# Patient Record
Sex: MALE | Race: WHITE | URBAN - METROPOLITAN AREA
[De-identification: names, ages, dates, MRNs, and addresses within clinical notes are randomized per-mention and may not be internally consistent; named-entity substitution may affect disease eponyms.]

---

## 2018-10-17 ENCOUNTER — APPOINTMENT (RX ONLY)
Dept: URBAN - METROPOLITAN AREA CLINIC 86 | Facility: CLINIC | Age: 28
Setting detail: DERMATOLOGY
End: 2018-10-17

## 2018-10-17 DIAGNOSIS — L663 OTHER SPECIFIED DISEASES OF HAIR AND HAIR FOLLICLES: ICD-10-CM

## 2018-10-17 DIAGNOSIS — L30.1 DYSHIDROSIS [POMPHOLYX]: ICD-10-CM

## 2018-10-17 DIAGNOSIS — L738 OTHER SPECIFIED DISEASES OF HAIR AND HAIR FOLLICLES: ICD-10-CM

## 2018-10-17 DIAGNOSIS — L73.9 FOLLICULAR DISORDER, UNSPECIFIED: ICD-10-CM

## 2018-10-17 PROBLEM — L02.02 FURUNCLE OF FACE: Status: ACTIVE | Noted: 2018-10-17

## 2018-10-17 PROBLEM — L70.0 ACNE VULGARIS: Status: ACTIVE | Noted: 2018-10-17

## 2018-10-17 PROCEDURE — ? COUNSELING

## 2018-10-17 PROCEDURE — 99202 OFFICE O/P NEW SF 15 MIN: CPT

## 2018-10-17 PROCEDURE — ? PRESCRIPTION

## 2018-10-17 RX ORDER — DOXYCYCLINE HYCLATE 100 MG/1
TABLET, COATED ORAL
Qty: 60 | Refills: 1 | Status: ERX | COMMUNITY
Start: 2018-10-17

## 2018-10-17 RX ORDER — CLOBETASOL PROPIONATE 0.5 MG/G
OINTMENT TOPICAL
Qty: 1 | Refills: 2 | Status: ERX | COMMUNITY
Start: 2018-10-17

## 2018-10-17 RX ORDER — CLINDAMYCIN PHOSPHATE 10 MG/ML
SOLUTION TOPICAL
Qty: 1 | Refills: 3 | Status: ERX | COMMUNITY
Start: 2018-10-17

## 2018-10-17 RX ADMIN — CLINDAMYCIN PHOSPHATE: 10 SOLUTION TOPICAL at 18:28

## 2018-10-17 RX ADMIN — DOXYCYCLINE HYCLATE: 100 TABLET, COATED ORAL at 18:28

## 2018-10-17 RX ADMIN — CLOBETASOL PROPIONATE: 0.5 OINTMENT TOPICAL at 18:38

## 2018-10-17 ASSESSMENT — LOCATION DETAILED DESCRIPTION DERM
LOCATION DETAILED: LEFT THENAR EMINENCE
LOCATION DETAILED: LEFT MEDIAL SUPERIOR CHEST

## 2018-10-17 ASSESSMENT — LOCATION SIMPLE DESCRIPTION DERM
LOCATION SIMPLE: LEFT HAND
LOCATION SIMPLE: CHEST

## 2018-10-17 ASSESSMENT — LOCATION ZONE DERM
LOCATION ZONE: TRUNK
LOCATION ZONE: HAND

## 2018-10-17 NOTE — PROCEDURE: COUNSELING
Detail Level: Simple
Patient Specific Counseling (Will Not Stick From Patient To Patient): — Recommended 5% BPO to wash chest with.
Detail Level: Zone